# Patient Record
Sex: FEMALE | Race: BLACK OR AFRICAN AMERICAN | Employment: FULL TIME | URBAN - METROPOLITAN AREA
[De-identification: names, ages, dates, MRNs, and addresses within clinical notes are randomized per-mention and may not be internally consistent; named-entity substitution may affect disease eponyms.]

---

## 2019-06-24 ENCOUNTER — HOSPITAL ENCOUNTER (OUTPATIENT)
Dept: PHYSICAL THERAPY | Age: 54
Discharge: HOME | End: 2019-06-24
Payer: MEDICAID

## 2019-06-24 PROCEDURE — 97162 PT EVAL MOD COMPLEX 30 MIN: CPT

## 2019-06-24 PROCEDURE — 97140 MANUAL THERAPY 1/> REGIONS: CPT

## 2019-06-24 NOTE — PROGRESS NOTES
[address] PHYSICAL THERAPY  [address] [place] [address], [facility] [address], [facility] - Phone: [phone]  Fax: [phone] / 75[address] [facility]  Patient Name: Gerald Morillo : 1965   Medical   Diagnosis: Bilateral ankle pain [M25.571, M25.572] Treatment Diagnosis: Bilateral ankle pain [M25.571, M25.572]   Onset Date: ~1.5yrs ago     Referral Source: Mohini Andrews MD [facility]): 2019   Prior Hospitalization: See medical history Provider #: [id number]   Prior Level of Function: Pain free amb   Comorbidities: none   Medications: Verified on Patient Summary List   The Plan of Care and following information is based on the information from the initial evaluation.   ===========================================================================================  Assessment / corrales information:  Gerald Morillo is a 47 y.o.  yo female with Dx of Bilateral ankle pain [M25.571, M25.572]. She reports the onset of L ankle pain after a fall ~1.5yrs ago. Sh currently ratesher pain as 7/10 at worst, 0/10 at best, primarily located at medial aspect of her L ankle. She complains of difficulty and increase pain with walking. Objective Findings:  Gait: significant increase in calcaneal EV and midfoot collapse noted, Ankle AROM: DF: R = 15 deg, L = 14 deg. PF: R = 40 deg, L = 38 deg. Hallux DF: R = 70 deg, L = 50 deg. Manual Muscle Testing:  L ankle IV, L hip abd and B hip ext are Reduced. Lumbar/SI Screening:  L4-5 L ERS, L iliosacral ant innominate noted.    Pt instructed in HEP and will f/u in clinic for PT.  ===========================================================================================  Eval Complexity: History MEDIUM  Complexity : 1-2 comorbidities / personal factors will impact the outcome/ POC ;  Examination  MEDIUM Complexity : 3 Standardized tests and measures addressing body structure, function, activity limitation and / or participation in recreation ; Presentation MEDIUM Complexity : Evolving with changing characteristics ; Decision Making MEDIUM Complexity : FOTO score of 26-74; Overall Complexity MEDIUM  Problem List: pain affecting function, decrease ROM, decrease strength, impaired gait/ balance, decrease ADL/ functional abilitiies, decrease activity tolerance and decrease flexibility/ joint mobility   Treatment Plan may include any combination of the following: Therapeutic exercise, Therapeutic activities, Neuromuscular re-education, Physical agent/modality, Gait/balance training, Manual therapy, Patient education, Self Care training, Functional mobility training and Home safety training  Patient / Family readiness to learn indicated by: asking questions  Persons(s) to be included in education: patient (P)  Barriers to Learning/Limitations: None  Measures taken, if barriers to learning:    Patient Goal (s): Decrease pain    Patient self reported health status: excellent  Rehabilitation Potential: good   Short Term Goals: To be accomplished in  1-2  weeks:  1. Independent with HEP. 2. Decrease max pain 25-50% to assist with amb   Long Term Goals: To be accomplished in  3-4  weeks:  1. Decrease max pain 50-75% to assist with amb  2. Increase FOTO score by 10% to show functional improvment. 3.  Will rate >/= +5 on Global Rating of Change and be prepared to DC to HEP. Frequency / Duration:   Patient to be seen  2-3  times per week for 3-4  weeks:  Patient / Caregiver education and instruction: self care and exercises    Therapist Signature: Zuleyka Mckeon DPT, OCS, SCS, CSCS Date: 3/69/0120   Certification Period: na Time: 12:08 PM   ===========================================================================================  I certify that the above Physical Therapy Services are being furnished while the patient is under my care.   I agree with the treatment plan and certify that this therapy is necessary. Physician Signature:        Date:       Time:     Please sign and return to [facility] or you may fax the signed copy to [phone]. Thank you.

## 2019-06-24 NOTE — PROGRESS NOTES
PHYSICAL THERAPY - DAILY TREATMENT NOTE    Patient Name: Xu Burt        Date: 2019  : 1965   YES Patient  Verified  Visit #:     Insurance: Payor: BLUE CROSS MEDICAID / Plan: [facility] HEALTHKEEPERS PLUS / Product Type: Managed Care Medicaid /      In time: 11:40 Out time: 12:15   Total Treatment Time: 35     Medicare Time Tracking (below)   Total Timed Codes (min):  35 1:1 Treatment Time:  35     TREATMENT AREA =  Bilateral ankle pain [M25.571, M25.572]    SUBJECTIVE  Pain Level (on 0 to 10 scale):    Medication Changes/New allergies or changes in medical history, any new surgeries or procedures? NO    If yes, update Summary List   Subjective Functional Status/Changes:  []  No changes reported     SEE IE          OBJECTIVE    12 min Manual Therapy: DTM post tib, SHot gun tech, L4-5 L ERS harish   Rationale:      decrease pain, increase ROM and increase tissue extensibility to improve patient's ability to amb    8 min Self Care: JENNIFER Barros   Rationale:    increase ROM, increase strength and improve coordination to improve the patients ability to amb      Billed With/As:   [] TE   [] TA   [] Neuro   [x] Self Care Patient Education: [x] Review HEP    [] Progressed/Changed HEP based on:   [] positioning   [] body mechanics   [] transfers   [] heat/ice application    [] other:       min Patient Education:  YES  Reviewed HEP   []  Progressed/Changed HEP based on:         Other Objective/Functional Measures:    SEE IE     Post Treatment Pain Level (on 0 to 10) scale:       ASSESSMENT  Assessment/Changes in Function:     SEE IE     []  See Progress Note/Recertification   Patient will continue to benefit from skilled PT services to modify and progress therapeutic interventions, address functional mobility deficits, address ROM deficits, address strength deficits, analyze and address soft tissue restrictions, analyze and cue movement patterns, analyze and modify body mechanics/ergonomics and assess and modify postural abnormalities to attain remaining goals. Progress toward goals / Updated goals:         PLAN  []  Upgrade activities as tolerated YES Continue plan of care   []  Discharge due to :    []  Other:      Therapist: Shabana Wilburn, PT, OCS, SCS, CSCS    Date: 6/24/2019 Time: 12:06 PM       No future appointments.

## 2019-06-27 ENCOUNTER — HOSPITAL ENCOUNTER (OUTPATIENT)
Dept: PHYSICAL THERAPY | Age: 54
Discharge: HOME | End: 2019-06-27
Payer: MEDICAID

## 2019-06-27 PROCEDURE — 97140 MANUAL THERAPY 1/> REGIONS: CPT

## 2019-06-27 PROCEDURE — 97110 THERAPEUTIC EXERCISES: CPT

## 2019-06-27 NOTE — PROGRESS NOTES
PHYSICAL THERAPY - DAILY TREATMENT NOTE    Patient Name: Devika Talavera        Date: 2019  : 1965   yes Patient  Verified  Visit #:   2   of   12  Insurance: Payor: BLUE CROSS MEDICAID / Plan: [employer]KEEPERS PLUS / Product Type: Managed Care Medicaid /      In time: 4:35 PM Out time: 5:42 PM   Total Treatment Time: 67     Medicare/BCBS Time Tracking (below)   Total Timed Codes (min):  57 1:1 Treatment Time: 30     TREATMENT AREA =  Bilateral ankle pain [M25.571, M25.572]    SUBJECTIVE  Pain Level (on 0 to 10 scale):  0  / 10   Medication Changes/New allergies or changes in medical history, any new surgeries or procedures?    no  If yes, update Summary List   Subjective Functional Status/Changes:  []  No changes reported     Patient currently reports no pain in the ankle, but has more soreness in the L LE and back from HEP       OBJECTIVE    Modalities Rationale:     decrease edema, decrease inflammation and decrease pain to improve patient's ability to complete work duties. min [] Estim, type/location:                                      []  att     []  unatt     []  w/US     []  w/ice    []  w/heat    min []  Mechanical Traction: type/lbs                   []  pro   []  sup   []  int   []  cont    []  before manual    []  after manual    min []  Ultrasound, settings/location:      min []  Iontophoresis w/ dexamethasone, location:                                               []  take home patch       []  in clinic   10 min [x]  Ice     []  Heat    location/position: To B ankle in supine with wedge post-session    min []  Vasopneumatic Device, press/temp:     min []  Other:    [] Skin assessment post-treatment (if applicable):    []  intact    []  redness- no adverse reaction     []redness  adverse reaction:          10/37 min Therapeutic Exercise:  [x]  See flow sheet   Rationale:      increase ROM and increase strength to improve the patients ability to perform walking activities. 20 min Manual Therapy: STM to L post tib, L FHL, B L/S paraspinals, and B glute med/piriformis   Rationale:      decrease pain, increase ROM and increase tissue extensibility to improve patient's ability to perform standing activities. Billed With/As:   [x] TE   [] TA   [] Neuro   [] Self Care Patient Education: [x] Review HEP    [] Progressed/Changed HEP based on:   [] positioning   [] body mechanics   [] transfers   [] heat/ice application    [] other:      Other Objective/Functional Measures:    1:1 TE = 10'    Presented to PT session with high wedges and work clothes. Advised pt to wear appropriate clothing and shoes to perform exercises. Patient responded with understanding     Initiated therex per POC (see flowsheet); req'd cues 100% of therex for proper form/technique    Significant TTP in L FHL and R>L glute med      Post Treatment Pain Level (on 0 to 10) scale:   0  / 10     ASSESSMENT  Assessment/Changes in Function:     Overall demonstrated good tolerance with initiation of PT interventions. Will continue to progress therex to maintain low pain levels and reduce likelihood of onset of symptoms     []  See Progress Note/Recertification   Patient will continue to benefit from skilled PT services to modify and progress therapeutic interventions, address functional mobility deficits, address ROM deficits, address strength deficits, analyze and address soft tissue restrictions, analyze and cue movement patterns, analyze and modify body mechanics/ergonomics and assess and modify postural abnormalities to attain remaining goals.    Progress toward goals / Updated goals:    Progressing towards STG #1 - HEP established and reports compliance     PLAN  [x]  Upgrade activities as tolerated yes Continue plan of care   []  Discharge due to :    []  Other:      Therapist: FIDENCIO Lezama    Date: 6/27/2019 Time: 7:00 PM     Future Appointments   Date Time Provider Fabian Stiles   6/27/2019  4:30 PM Halie Cerda [facility]   7/1/2019  1:30 PM Rico Machado, PT [facility]   7/3/2019  3:30 PM Nohemi Garcia [facility]   7/8/2019  1:30 PM Rico Machado, PT [facility]   [facility]   7/16/2019 11:00 AM Rico Machado, PT [facility]   7/23/2019 11:30 AM Rosa Rangel, PT [facility]

## 2019-07-01 ENCOUNTER — HOSPITAL ENCOUNTER (OUTPATIENT)
Dept: PHYSICAL THERAPY | Age: 54
Discharge: HOME | End: 2019-07-01
Payer: MEDICAID

## 2019-07-01 PROCEDURE — 97110 THERAPEUTIC EXERCISES: CPT

## 2019-07-01 PROCEDURE — 97140 MANUAL THERAPY 1/> REGIONS: CPT

## 2019-07-01 NOTE — PROGRESS NOTES
PHYSICAL THERAPY - DAILY TREATMENT NOTE    Patient Name: Rhae Essex        Date: 2019  : 1965   yes Patient  Verified  Visit #:   3   of   12  Insurance: Payor: REYNALDO Lopez / Plan: [address] [place] [address] / Product Type: Managed Care Medicaid /      In time: 144 Out time: 235   Total Treatment Time: 51     Medicare/BCBS Time Tracking (below)   Total Timed Codes (min):  51 1:1 Treatment Time:  35     TREATMENT AREA =  Bilateral ankle pain [M25.571, M25.572]    SUBJECTIVE  Pain Level (on 0 to 10 scale):  2   10   Medication Changes/New allergies or changes in medical history, any new surgeries or procedures?    no  If yes, update Summary List   Subjective Functional Status/Changes:  []  No changes reported     Patient reports she was really sore after her last appointment and that the inside of her leg on the L only hurts when we touch it. OBJECTIVE    36 min Therapeutic Exercise:  [x]  See flow sheet   Rationale:      increase ROM and increase strength to improve the patients ability to perform walking activities. 15 min Manual Therapy: STM to L posterior tib, L FHL in prone; L ankle DF stretch with knee flexed and extended   Rationale:      decrease pain, increase ROM, increase tissue extensibility and decrease trigger points to improve patient's ability to perform standing activities.        Billed With/As:   [x] TE   [] TA   [] Neuro   [] Self Care Patient Education: [x] Review HEP    [] Progressed/Changed HEP based on:   [] positioning   [] body mechanics   [] transfers   [] heat/ice application    [] other:      Other Objective/Functional Measures:    1:1 TE 21'  Patient presenting with significant tenderness to L posterior tib during MT, limiting effectiveness  Patient requiring encouragement during glute bridge and SL clams today as she reported exercise was difficult to perform     Post Treatment Pain Level (on 0 to 10) scale:     / 10 ASSESSMENT  Assessment/Changes in Function:     Patient noted a slight reduction in symptoms post session. Patient educated on rationale of treatment and reasoning for elevated soreness after her first follow up session. []  See Progress Note/Recertification   Patient will continue to benefit from skilled PT services to modify and progress therapeutic interventions, address functional mobility deficits, address ROM deficits, address strength deficits, analyze and address soft tissue restrictions, analyze and cue movement patterns, analyze and modify body mechanics/ergonomics, assess and modify postural abnormalities and address imbalance/dizziness to attain remaining goals.    Progress toward goals / Updated goals:    Progressing with STG#2     PLAN  [x]  Upgrade activities as tolerated yes Continue plan of care   []  Discharge due to :    []  Other:      Therapist: Miki Thomas PT    Date: 7/1/2019 Time: 3:31 PM     Future Appointments   Date Time Provider Fabian Stiles   7/3/2019  3:30 PM Nicole Garcia [facility]   7/8/2019  1:30 PM Chel Lynne PT [facility]   7/10/2019 10:30 AM Nicole Garcia [facility]   7/16/2019 11:00 AM Chel Lynne PT [facility]   7/23/2019 11:30 AM Chel Lynne PT [facility]

## 2019-07-08 ENCOUNTER — APPOINTMENT (OUTPATIENT)
Dept: PHYSICAL THERAPY | Age: 54
End: 2019-07-08
Payer: MEDICAID

## 2019-07-09 ENCOUNTER — HOSPITAL ENCOUNTER (OUTPATIENT)
Dept: PHYSICAL THERAPY | Age: 54
Discharge: HOME | End: 2019-07-09
Payer: MEDICAID

## 2019-07-09 PROCEDURE — 97110 THERAPEUTIC EXERCISES: CPT

## 2019-07-09 PROCEDURE — 97140 MANUAL THERAPY 1/> REGIONS: CPT

## 2019-07-09 NOTE — PROGRESS NOTES
PHYSICAL THERAPY - DAILY TREATMENT NOTE    Patient Name: Collette Keeler        Date: 2019  : 1965   yes Patient  Verified  Visit #:      12  Insurance: Payor: BLUE CROSS MEDICAID / Plan: [address] [place] [address] / Product Type: Managed Care Medicaid /      In time: 871 Out time: 114   Total Treatment Time: 46     Medicare/BCBS Time Tracking (below)   Total Timed Codes (min):  46 1:1 Treatment Time:  41     TREATMENT AREA =  Bilateral ankle pain [M25.571, M25.572]    SUBJECTIVE  Pain Level (on 0 to 10 scale):  0  / 10   Medication Changes/New allergies or changes in medical history, any new surgeries or procedures?    no  If yes, update Summary List   Subjective Functional Status/Changes:  []  No changes reported     Patient reports she isn't having any pain right now, \"it only hurts when you guys mess with it. \"          OBJECTIVE    34 min Therapeutic Exercise:  [x]  See flow sheet   Rationale:      increase ROM and increase strength to improve the patients ability to perform walking activities. 12 min Manual Therapy: STM to R posterior tib, R FHL; R ankle DF stretch with knee flexed   Rationale:      decrease pain, increase ROM, increase tissue extensibility and decrease trigger points to improve patient's ability to perform standing activities. Billed With/As:   [x] TE   [] TA   [] Neuro   [] Self Care Patient Education: [x] Review HEP    [] Progressed/Changed HEP based on:   [] positioning   [] body mechanics   [] transfers   [] heat/ice application    [] other:      Other Objective/Functional Measures:    1:1 TE 34'  Focused on MT to R LE as patient reported elevated pain after MT to her L LE-plan to resume at [address] pending tolerance  Assessed patient footwear, currently utilized shoes with do not provide wide toe box and proper stability of mid and forefoot.  Instructed patient on this as well as examples of proper sneakers     Post Treatment Pain Level (on 0 to 10) scale:   2  / 10     ASSESSMENT  Assessment/Changes in Function:     Patient noted a slight increase in soreness post session. Patient has been late to several appointments and has been educated on importance of consistent attendance in PT as well as arriving to her appointments on time, patient acknowledged understanding. []  See Progress Note/Recertification   Patient will continue to benefit from skilled PT services to modify and progress therapeutic interventions, address functional mobility deficits, address ROM deficits, address strength deficits, analyze and address soft tissue restrictions, analyze and cue movement patterns, analyze and modify body mechanics/ergonomics and assess and modify postural abnormalities to attain remaining goals.    Progress toward goals / Updated goals:    No significant progress with LTG's this session     PLAN  [x]  Upgrade activities as tolerated yes Continue plan of care   []  Discharge due to :    []  Other:      Therapist: Gianni Worthington PT    Date: 7/9/2019 Time: 5:03 PM     Future Appointments   Date Time Provider Fabian Stiles   7/11/2019 11:00 AM Gissel Garcia [facility]   7/16/2019 11:00 AM Matthew Nazario PT [facility]   7/23/2019 11:30 AM Matthew Nazario PT [facility]

## 2019-07-10 ENCOUNTER — APPOINTMENT (OUTPATIENT)
Dept: PHYSICAL THERAPY | Age: 54
End: 2019-07-10
Payer: MEDICAID

## 2019-07-11 ENCOUNTER — HOSPITAL ENCOUNTER (OUTPATIENT)
Dept: PHYSICAL THERAPY | Age: 54
Discharge: HOME | End: 2019-07-11
Payer: MEDICAID

## 2019-07-11 PROCEDURE — 97110 THERAPEUTIC EXERCISES: CPT

## 2019-07-11 NOTE — PROGRESS NOTES
PHYSICAL THERAPY - DAILY TREATMENT NOTE    Patient Name: Christiano Neves        Date: 2019  : 1965   yes Patient  Verified  Visit #:      12  Insurance: Payor: BLUE CROSS MEDICAID / Plan: [facility] [email or web address]US / Product Type: Managed Care Medicaid /      In time: 11:06 AM Out time: 12:18 PM   Total Treatment Time: 72     Medicare/BCBS Time Tracking (below)   Total Timed Codes (min):  62 1:1 Treatment Time:  24     TREATMENT AREA =  Bilateral ankle pain [M25.571, M25.572]    SUBJECTIVE  Pain Level (on 0 to 10 scale):  1  / 10   Medication Changes/New allergies or changes in medical history, any new surgeries or procedures?    no  If yes, update Summary List   Subjective Functional Status/Changes:  []  No changes reported     Patient continues to report L>R lateral ankle pain. States that \"it really hurts when you guys touch it\" and first thing in the morning. States she \"kind of does home exercises at home, only in the mornings\" and never ices or elevates. .        OBJECTIVE  Modalities Rationale:     decrease edema, decrease inflammation and decrease pain to improve patient's ability to perform prolonged sitting activities.     min [] Estim, type/location:                                      []  att     []  unatt     []  w/US     []  w/ice    []  w/heat    min []  Mechanical Traction: type/lbs                   []  pro   []  sup   []  int   []  cont    []  before manual    []  after manual    min []  Ultrasound, settings/location:      min []  Iontophoresis w/ dexamethasone, location:                                               []  take home patch       []  in clinic   10  min [x]  Ice     []  Heat    location/position: To B ankle in supine with wedge post-session (with extra layer)    min []  Vasopneumatic Device, press/temp:     min []  Other:    [] Skin assessment post-treatment (if applicable):    []  intact    []  redness- no adverse reaction     []redness  adverse reaction: 24/ min Therapeutic Exercise:  [x]  See flow sheet   Rationale:      increase ROM and increase strength to improve the patients ability to perform prolonged walking/standing activities. Billed With/As:   [x] TE   [] TA   [] Neuro   [] Self Care Patient Education: [x] Review HEP    [x] Progressed/Changed HEP based on:   [] positioning   [] body mechanics   [] transfers   [] heat/ice application    [x] other: added multiple foot/ankle strengthening exercises. Other Objective/Functional Measures:    1:1 TE = 24'    Issued and reviewed updated HEP. Educated pt on importance on compliance with HEP (morning and evening) as well as icing and elevating to reduce c/o swelling. Discussed and educated pt on holding on manual therapy due to pt's poor tolerance. Pt responded with understanding. Added towel crunches and standing HR/TR to increase ankle stability. Added incline board stretch and fibularis longus stretch to decrease tightness. Post Treatment Pain Level (on 0 to 10) scale:   0 / 10     ASSESSMENT  Assessment/Changes in Function:     Challenged with PREs due to weakness and req'd moderate cues for all therex. Able to reduce ankle pain post-tx session, however noted minimal increase in pain/soreness in L hip. Discussed rationale for glute/hip strengthening importance for ankle pain. Pt responded with some understanding. []  See Progress Note/Recertification   Patient will continue to benefit from skilled PT services to modify and progress therapeutic interventions, address functional mobility deficits, address ROM deficits, address strength deficits, analyze and address soft tissue restrictions, analyze and cue movement patterns, analyze and modify body mechanics/ergonomics and assess and modify postural abnormalities to attain remaining goals.    Progress toward goals / Updated goals:    Reassess FOTO/GROC goal NV (LTGs #2 and #3)  Steady progress towards pain reduction goal (LTG #1) PLAN  [x]  Upgrade activities as tolerated yes Continue plan of care   []  Discharge due to :    []  Other:      Therapist: FIDENCIO Carty    Date: 7/11/2019 Time: 12:29 PM     Future Appointments   Date Time Provider Fabian Stiles   7/11/2019 11:00 AM Marina Garcia [facility]   7/16/2019 11:00 AM Lilibeth Vallecillo, LAILA [facility]   7/23/2019 11:30 AM Lilibeth Vallecillo, LAILA [facility]

## 2019-07-15 ENCOUNTER — APPOINTMENT (OUTPATIENT)
Dept: PHYSICAL THERAPY | Age: 54
End: 2019-07-15
Payer: MEDICAID

## 2019-07-16 ENCOUNTER — HOSPITAL ENCOUNTER (OUTPATIENT)
Dept: PHYSICAL THERAPY | Age: 54
Discharge: HOME | End: 2019-07-16
Payer: MEDICAID

## 2019-07-16 PROCEDURE — 97110 THERAPEUTIC EXERCISES: CPT

## 2019-07-16 NOTE — PROGRESS NOTES
PHYSICAL THERAPY - DAILY TREATMENT NOTE    Patient Name: Amber Landa        Date: 2019  : 1965   yes Patient  Verified  Visit #:   6   of   12  Insurance: Payor: Сергей Lara / Plan: [facility] HEALTHKEEPERS PLUS / Product Type: Managed Care Medicaid /      In time: 7946 Out time: 7896   Total Treatment Time: 60     Medicare/BCBS Time Tracking (below)   Total Timed Codes (min):  50 1:1 Treatment Time:  10     TREATMENT AREA =  Bilateral ankle pain [M25.571, M25.572]    SUBJECTIVE  Pain Level (on 0 to 10 scale):  1.5-2  / 10   Medication Changes/New allergies or changes in medical history, any new surgeries or procedures?    no  If yes, update Summary List   Subjective Functional Status/Changes:  []  No changes reported     Patient reports her foot doesn't feel as swollen as much anymore. Patient does feel more back pain and stiffness then foot pain. OBJECTIVE  60 min Therapeutic Exercise:  [x]  See flow sheet   Rationale:      increase ROM, increase strength, improve coordination and improve balance to improve the patients ability to perform walking activities. Billed With/As:   [x] TE   [] TA   [] Neuro   [] Self Care Patient Education: [x] Review HEP    [] Progressed/Changed HEP based on:   [] positioning   [] body mechanics   [] transfers   [] heat/ice application    [] other:      Other Objective/Functional Measures:    1:1 TE 10'  FOTO 64/100  Patient requires cuing throughout her session in order to ensure proper form/technique     Post Treatment Pain Level (on 0 to 10) scale:   0  / 10     ASSESSMENT  Assessment/Changes in Function:     Patient noted reduced pain post session. Educated patient she can no longer be consistently late to her appointment or therapy will be discontinued. Patient acknowledged understanding.       []  See Progress Note/Recertification   Patient will continue to benefit from skilled PT services to modify and progress therapeutic interventions, address functional mobility deficits, address ROM deficits, address strength deficits, analyze and address soft tissue restrictions, analyze and cue movement patterns, analyze and modify body mechanics/ergonomics and assess and modify postural abnormalities to attain remaining goals.    Progress toward goals / Updated goals:    Met LTG#2     PLAN  [x]  Upgrade activities as tolerated yes Continue plan of care   []  Discharge due to :    []  Other:      Therapist: Adriana Arreola PT    Date: 7/16/2019 Time: 12:35 PM     Future Appointments   Date Time Provider Fabian Stiles   7/17/2019 12:00 PM Darylene Gobble [facility]   7/23/2019 11:30 AM Paulette Paris, PT [facility]   7/25/2019 11:00 AM Kiersten Arriaga, PTA [facility]

## 2019-07-17 ENCOUNTER — HOSPITAL ENCOUNTER (OUTPATIENT)
Dept: PHYSICAL THERAPY | Age: 54
Discharge: HOME | End: 2019-07-17
Payer: MEDICAID

## 2019-07-17 PROCEDURE — 97110 THERAPEUTIC EXERCISES: CPT

## 2019-07-18 ENCOUNTER — APPOINTMENT (OUTPATIENT)
Dept: PHYSICAL THERAPY | Age: 54
End: 2019-07-18
Payer: MEDICAID

## 2019-07-22 ENCOUNTER — APPOINTMENT (OUTPATIENT)
Dept: PHYSICAL THERAPY | Age: 54
End: 2019-07-22
Payer: MEDICAID

## 2019-07-23 ENCOUNTER — HOSPITAL ENCOUNTER (OUTPATIENT)
Dept: PHYSICAL THERAPY | Age: 54
Discharge: HOME | End: 2019-07-23
Payer: MEDICAID

## 2019-07-23 PROCEDURE — 97110 THERAPEUTIC EXERCISES: CPT

## 2019-07-23 NOTE — PROGRESS NOTES
PHYSICAL THERAPY - DAILY TREATMENT NOTE    Patient Name: Sung Talavera        Date: 2019  : 1965   yes Patient  Verified  Visit #:     Insurance: Payor: Arteaga Maroon / Plan: [facility] HEALTHKEEPERS PLUS / Product Type: Managed Care Medicaid /      In time: 144 Out time: 1140   Total Treatment Time: 25     Medicare/BCBS Time Tracking (below)   Total Timed Codes (min):  25 1:1 Treatment Time:  25     TREATMENT AREA =  Bilateral ankle pain [M25.571, M25.572]    SUBJECTIVE  Pain Level (on 0 to 10 scale):  3  / 10   Medication Changes/New allergies or changes in medical history, any new surgeries or procedures?    no  If yes, update Summary List   Subjective Functional Status/Changes:  []  No changes reported     Pt reporting 8/10 recent max pain. Pt reports 60% improvement in symptoms since beginning PT. Pt arrived 13' late for scheduled appointment. Pt has been talked to about this 2x in the past.  Plan to discharge today. OBJECTIVE  25 min Therapeutic Exercise:  [x]  See flow sheet   Rationale:      increase ROM and increase strength to improve the patients ability to perform pain free ADLs. Billed With/As:   [x] TE   [] TA   [] Neuro   [] Self Care Patient Education: [x] Review HEP    [] Progressed/Changed HEP based on:   [] positioning   [] body mechanics   [] transfers   [] heat/ice application    [] other:      Other Objective/Functional Measures:    GROC = +4     (R) hip abd = 4-/5   (L) hip abd = 4/5     Updated and reviewed HEP exercises.       Post Treatment Pain Level (on 0 to 10) scale:   2  / 10     ASSESSMENT  Assessment/Changes in Function:     See DC      []  See Progress Note/Recertification   Patient will continue to benefit from skilled PT services to    Progress toward goals / Updated goals:    See DC      PLAN  []  Upgrade activities as tolerated  Continue plan of care   [x]  Discharge due to : Program complete   []  Other:      Therapist: Tacos Queen Naveed [place], [place]    Date: 7/23/2019 Time: 11:27 AM     Future Appointments   Date Time Provider Fabian Stiles   7/25/2019  9:30 AM Nabil TAPIA [facility]

## 2019-07-23 NOTE — PROGRESS NOTES
[address] PHYSICAL THERAPY  [address] [place] [address] [address], [address],[place], [address] [facility] - Phone: [phone]  Fax: [phone]  DISCHARGE SUMMARY  Patient Name: Amber Landa : 1965   Treatment/Medical Diagnosis: Bilateral ankle pain [M25.571, M25.572]   Referral Source: Sal Spears MD     Date of Initial Visit: 2019 Attended Visits: 8 Missed Visits: 3     SUMMARY OF TREATMENT  PT has consisted of initial evaluation, therapeutic exercises, HEP, manual therapy, patient education, and modalities. CURRENT STATUS  Pt presented to [facility] PT w/ c/o (B) ankle pain. Pt currently reporting 8/10 max pain and 60% improvement in symptoms since beginning PT. See below for current FOTO/GROC. Hip abd strength: (R) 4-/5, (L) 4/5. Pt has been provided long-term HEP. Pt has missed 3 visits and is consistently 10-15' late for scheduled appointments. Spoke with pt regarding poor/late attendance on 2 occasions with no affect (arrived 13' late on ). Given fair progress toward goals, pt a good candidate for discharge at this time. Goal/Measure of Progress Goal Met? 1. Decrease max pain 50-75% to assist with amb   Status at last Eval: 7/10 max  Current Status: 8/10 no   2. Increase FOTO score by 10% to show functional improvment. Status at last Eval: 54 Current Status: 64 yes   3. Will rate >/= +5 on Global Rating of Change and be prepared to DC to HEP. Status at last Eval: Na  Current Status: +4  progressing     RECOMMENDATIONS  Discontinue therapy. Progressing towards or have reached established goals. If you have any questions/comments please contact us directly at [phone]. Thank you for allowing us to assist in the care of your patient.     LPTA Signature: Vishnu Murray, PTA Date: 2019   Therapist Signature: Joellen Michelle, PT, OCS, SCS, CSCS Time: 12:19 PM     NOTE TO PHYSICIAN:  Your patient's insurance requires this discharge note be signed and returned. PLEASE COMPLETE THE ORDERS BELOW AND RETURN TO:  [facility] PHYSICAL THERAPY    ___ I have read the above report and request that my patient be discharged from therapy.      Physician Signature:        Date:       Time:

## 2019-07-25 ENCOUNTER — APPOINTMENT (OUTPATIENT)
Dept: PHYSICAL THERAPY | Age: 54
End: 2019-07-25
Payer: MEDICAID

## 2019-10-07 ENCOUNTER — HOSPITAL ENCOUNTER (OUTPATIENT)
Dept: PHYSICAL THERAPY | Age: 54
Discharge: HOME | End: 2019-10-07
Payer: MEDICAID

## 2019-10-07 PROCEDURE — 97110 THERAPEUTIC EXERCISES: CPT

## 2019-10-07 PROCEDURE — 97162 PT EVAL MOD COMPLEX 30 MIN: CPT

## 2019-10-07 NOTE — PROGRESS NOTES
PHYSICAL THERAPY - DAILY TREATMENT NOTE    Patient Name: Mk Arteaga        Date: 10/7/2019  : 1965   yes Patient  Verified  Visit #:   1   of   12  Insurance: Payor: BLUE CROSS MEDICAID / Plan: [facility] HEALTHKEEPERS PLUS / Product Type: Managed Care Medicaid /      In time: 1:56 Out time: 2:45   Total Treatment Time: 49     Medicare/BCBS Time Tracking (below)   Total Timed Codes (min):  15 1:1 Treatment Time:  49     TREATMENT AREA =  Pain in left hip [M25.552]  Low back pain [M54.5]    SUBJECTIVE  Pain Level (on 0 to 10 scale):  3  / 10   Medication Changes/New allergies or changes in medical history, any new surgeries or procedures?    no  If yes, update Summary List   Subjective Functional Status/Changes:  []  No changes reported     See initial eval          OBJECTIVE    15 min Therapeutic Exercise:  [x]  See flow sheet   Rationale:      increase strength to improve the patients ability to complete amb/standing activities. Billed With/As:   [x] TE   [] TA   [] Neuro   [] Self Care Patient Education: [x] Review HEP    [] Progressed/Changed HEP based on:   [] positioning   [] body mechanics   [] transfers   [] heat/ice application    [x] other: Pt instructed on and given HEP to be completed daily. Pt educated on correct body mechanics with transfers. Pt educated on signs/sxs of red flags and to seek immediate medical attention/911 if those occur. Reviewed proper attire/footwear for PT sessions. Reviewed POC and goals. Pt reports understanding. Other Objective/Functional Measures:    See initial eval     Post Treatment Pain Level (on 0 to 10) scale:   3   10     ASSESSMENT  Assessment/Changes in Function:     See initial eval     []  See Progress Note/Recertification   Patient will continue to benefit from skilled PT services to see initial eval   Progress toward goals / Updated goals:    Pt denied sharp pains or red flags with initial eval or therapeutic ex.  See initial eval. PLAN  [x]  Upgrade activities as tolerated yes Continue plan of care   []  Discharge due to :    [x]  Other: PT 2x/week for 4-6 weeks.      Therapist: Rupinder Lovett PT    Date: 10/7/2019 Time: 2:45 PM     Future Appointments   Date Time Provider Fabian Stiles   10/10/2019  3:00 PM Radhames Graves [facility]   10/14/2019  1:00 PM Sierra King PTA [facility]

## 2019-10-07 NOTE — PROGRESS NOTES
[address] PHYSICAL THERAPY  [address] [place] [address] [address],[place], [address] [facility] - Phone: [phone]  Fax: [phone] OF [facility] / [address] [employer]  Patient Name: Rolf Archer : 1965   Medical   Diagnosis: L hip pain, Lumbago Treatment Diagnosis: Pain in left hip [M25.552]  Low back pain [M54.5]   Onset Date: 2019     Referral Source: Jennifer Crowe* Start of Care [facility]): 10/7/2019   Prior Hospitalization: See medical history Provider #: [id number]   Prior Level of Function: Complete prolonged standing (>30 minutes), washing dishes and walking for exercise without LBP or L hip pain/numbness   Comorbidities: Weight gain of 15 lbs since 2019, Recent fall (19) with R shoulder, wrist and rib pain; Current B knee pain   Medications: Verified on Patient Summary List   The Plan of Care and following information is based on the information from the initial evaluation.   ===========================================================================================  Assessment / key information:  Pt is a 50 y/o female reporting L hip/glute pain and LBP rated 2-7/10 that started in 2019 while completing PT for B foot pain. Pt reports noticing pain/numbness/tingling in L hip with clam exercise and LBP with massage. Pt reports pain/sxs continued so she saw MD, who referred her to PT. Pt has PMHx of chronic LBP after falling 2 years ago which was Rx by chiropractor (last manipulation in 2019). Pt describes pain as dull. Pt reports intermittent numbness/tingling in l/s, L hip and glute which radiates distally (pt unable to state if pain/sxs go to knee or foot). Pt reports intermittent L groin pain. L/s and L hip/glute pain/sxs increase with walking around [facility], stair negotiation, clam exercise, washing dishes and prolonged standing (> 30 minutes); decreases with rest, stretching and massage.  Pt denies instability in LE, but reports she is more cautious with amb since falling over rock while walking in [place] on 9/19/19. Pt denies recent imaging of l/s or hips. Pt denies bowel/bladder issues or red flags. Pt demonstrates antalgic gait with decreased naima, increased pronation and (+) L>R Trendelenberg, decreased l/s AROM (flex to toes, ext decreased 75%, B lat flex to knees, B Rot decreased 50%, discomfort with CORAZON), decreased B hip AROM (R: 22 IR, 27 ER, 90 flex; L: 31 IR, 24 ER, 90 flex), (+) R 90/90 SLR, (-) B Scour, Slump and SLR, no signs/sxs of DVT, increased sensation to light touch L med calf, poor body mechanics with transfers, decreased core strength/stability, decreased B hip strength (4/5 R hip flex, 4-/5 L hip flex, 3+/5 B hip ABD, 3/5 B hip ext), 4+/5 B quad, 4/5 B HS, decreased B RF/quad flexibility, TTP B glute max/med, piriformis, proximal HS, and l/s paraspinals, even innominates and decreased functional mobility. FOTO Score: 43/100  ===========================================================================================  Eval Complexity: History MEDIUM  Complexity : 1-2 comorbidities / personal factors will impact the outcome/ POC ;  Examination  HIGH Complexity : 4+ Standardized tests and measures addressing body structure, function, activity limitation and / or participation in recreation ; Presentation MEDIUM Complexity : Evolving with changing characteristics ;   Decision Making MEDIUM Complexity : FOTO score of 26-74; Overall Complexity MEDIUM  Problem List: pain affecting function, decrease ROM, decrease strength, impaired gait/ balance, decrease ADL/ functional abilitiies, decrease activity tolerance, decrease flexibility/ joint mobility and decrease transfer abilities   Treatment Plan may include any combination of the following: Therapeutic exercise, Therapeutic activities, Neuromuscular re-education, Physical agent/modality, Gait/balance training, Manual therapy, Aquatic therapy, Patient education, Self Care training, Functional mobility training, Home safety training and Stair training  Patient / Family readiness to learn indicated by: asking questions and trying to perform skills  Persons(s) to be included in education: patient (P)  Barriers to Learning/Limitations: None  Measures taken, if barriers to learning:    Patient Goal (s): \"Correct whatever causes the pain and numbness\"   Patient self reported health status: excellent  Rehabilitation Potential: good   Short Term Goals: To be accomplished in  2  weeks:  1. Pt to demonstrate independence with HEP to improve glute/core/hip strength/stability for amb/standing activities. 2. Pt to demonstrate correct body mechanics with supine to sit and sit to stand transfers to protect l/s and improve safety and efficiency of transfers at home.  Long Term Goals: To be accomplished in  4-6  weeks:  1. Pt to demonstrate 2 grade improvement or > B hip ext/ABD strength to improve LE stability for prolonged standing/amb activities. 2. Pt to report 8 point or > improvement on FOTO scores to improve functional mobility for prolonged amb/standing activities and stair negotiation. 3. Pt to report 50% or > decrease in max pain levels to improve functional mobility for prolonged amb/standing activities and stair negotiation. Frequency / Duration:   Patient to be seen  2  times per week for 4-6  weeks:  Patient / Caregiver education and instruction: self care, activity modification and exercises  Therapist Signature: Elias Zamudio PT Date: 69/1/5428   Certification Period: N/a Time: 2:45 PM   ===========================================================================================  I certify that the above Physical Therapy Services are being furnished while the patient is under my care. I agree with the treatment plan and certify that this therapy is necessary.     Physician Signature:        Date:       Time:     Please sign and return to [facility] [place]. or you may fax the signed copy to [phone]. Thank you.

## 2019-10-10 ENCOUNTER — HOSPITAL ENCOUNTER (OUTPATIENT)
Dept: PHYSICAL THERAPY | Age: 54
Discharge: HOME | End: 2019-10-10
Payer: MEDICAID

## 2019-10-10 PROCEDURE — 97110 THERAPEUTIC EXERCISES: CPT

## 2019-10-10 NOTE — PROGRESS NOTES
PHYSICAL THERAPY - DAILY TREATMENT NOTE    Patient Name: Ayush Alejandro        Date: 10/10/2019  : 1965   yes Patient  Verified  Visit #:     Insurance: Payor: Erika Arizmendi / Plan: [address] [place] [address] / Product Type: Managed Care Medicaid /      In time: 315 Out time: 405   Total Treatment Time: 50     Medicare/BCBS Time Tracking (below)   Total Timed Codes (min):  40 1:1 Treatment Time:  40     TREATMENT AREA =  Pain in left hip [M25.552]  Low back pain [M54.5]    SUBJECTIVE  Pain Level (on 0 to 10 scale):  3  / 10   Medication Changes/New allergies or changes in medical history, any new surgeries or procedures?    no  If yes, update Summary List   Subjective Functional Status/Changes:  []  No changes reported     Pt states it's not bothering her much today because she hasn't done a lot. Pt reports pain across the low back when it is hurting. OBJECTIVE  Modalities Rationale:     decrease inflammation and decrease pain to improve patient's ability to perform pain free ADLs. min [] Estim, type/location:                                      []  att     []  unatt     []  w/US     []  w/ice    []  w/heat    min []  Mechanical Traction: type/lbs                   []  pro   []  sup   []  int   []  cont    []  before manual    []  after manual    min []  Ultrasound, settings/location:      min []  Iontophoresis w/ dexamethasone, location:                                               []  take home patch       []  in clinic   10 min [x]  Ice     []  Heat    location/position: Supine, lumbar.      min []  Vasopneumatic Device, press/temp:     min []  Other:    [x] Skin assessment post-treatment (if applicable):    [x]  intact    []  redness- no adverse reaction     []redness  adverse reaction:        40 min Therapeutic Exercise:  [x]  See flow sheet   Rationale:      increase ROM, increase strength and improve coordination to improve the patients ability to perform pain free ADLs. Billed With/As:   [x] TE   [] TA   [] Neuro   [] Self Care Patient Education: [x] Review HEP    [] Progressed/Changed HEP based on:   [] positioning   [] body mechanics   [] transfers   [] heat/ice application    [] other:      Other Objective/Functional Measures: Added multiple exercises to improve core strength and stability for ADLs. Post Treatment Pain Level (on 0 to 10) scale:   1  / 10     ASSESSMENT  Assessment/Changes in Function:     No exacerbation of symptoms with today's session. []  See Progress Note/Recertification   Patient will continue to benefit from skilled PT services to modify and progress therapeutic interventions, address functional mobility deficits, address ROM deficits, address strength deficits, analyze and address soft tissue restrictions, analyze and cue movement patterns, analyze and modify body mechanics/ergonomics and assess and modify postural abnormalities to attain remaining goals. Progress toward goals / Updated goals:    Initiated therex.       PLAN  [x]  Upgrade activities as tolerated yes Continue plan of care   []  Discharge due to :    []  Other:      Therapist: Gabe Sullivan PTA    Date: 10/10/2019 Time: 3:21 PM     Future Appointments   Date Time Provider Fabian Stiles   10/14/2019  1:00 PM Bettie TAPIA [facility]

## 2019-10-17 ENCOUNTER — HOSPITAL ENCOUNTER (OUTPATIENT)
Dept: PHYSICAL THERAPY | Age: 54
Discharge: HOME | End: 2019-10-17
Payer: MEDICAID

## 2019-10-17 PROCEDURE — 97110 THERAPEUTIC EXERCISES: CPT

## 2019-10-17 NOTE — PROGRESS NOTES
PHYSICAL THERAPY - DAILY TREATMENT NOTE    Patient Name: Gavi Landaverde        Date: 10/17/2019  : 1965   yes Patient  Verified  Visit #:   3   of   12  Insurance: Payor: BLUE CROSS MEDICAID / Plan: [facility] HEALTHKEEPERS PLUS / Product Type: Managed Care Medicaid /      In time: 12:06 PM Out time: 12:52 PM   Total Treatment Time: 46     Medicare/BCBS Time Tracking (below)   Total Timed Codes (min):  36 1:1 Treatment Time:  36     TREATMENT AREA =  Pain in left hip [M25.552]  Low back pain [M54.5]    SUBJECTIVE  Pain Level (on 0 to 10 scale): 7 / 10   Medication Changes/New allergies or changes in medical history, any new surgeries or procedures?    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 Reports \"kind of doing\" her HEP. OBJECTIVE  Modalities Rationale:     decrease inflammation and decrease pain to improve patient's ability to perform pain free ADLs.     min [] Estim, type/location:                                      []  att     []  unatt     []  w/US     []  w/ice    []  w/heat    min []  Mechanical Traction: type/lbs                   []  pro   []  sup   []  int   []  cont    []  before manual    []  after manual    min []  Ultrasound, settings/location:      min []  Iontophoresis w/ dexamethasone, location:                                               []  take home patch       []  in clinic   10 min [x]  Ice     []  Heat    location/position: To L/S in supine with wedge post-session    min []  Vasopneumatic Device, press/temp:     min []  Other:    [x] Skin assessment post-treatment (if applicable):    [x]  intact    []  redness- no adverse reaction     []redness  adverse reaction:        36 min Therapeutic Exercise:  [x]  See flow sheet   Rationale:      increase ROM, increase strength and improve coordination to improve the patients ability to perform pain free ADLs. Billed With/As:   [x] TE   [] TA   [] Neuro   [] Self Care Patient Education: [x] Review HEP    [] Progressed/Changed HEP based on:   [] positioning   [] body mechanics   [] transfers   [] heat/ice application    [] other:      Other Objective/Functional Measures:    1:1 TE = 36'    Continued therex per flowsheet. Req'd cues 100% of therex for proper form and gentle ROM/muscle activation. Pt easily distracted by environmental factors. Discussed at length on arriving on time or early to future PT sessions. Pt responded she arrived on time, but had to use the restroom. Patient also stated she has 15 minutes after her appt time to arrive. Therapist advised against consistently arriving late to her appts to allow max benefits from PT interventions. Post Treatment Pain Level (on 0 to 10) scale:   6 / 10     ASSESSMENT  Assessment/Changes in Function:     Patient noted minimal reduction in pain level post tx session. Able to perform therex without signs of distress or red flags. Educated pt on HEP compliance to self manage symptoms. []  See Progress Note/Recertification   Patient will continue to benefit from skilled PT services to modify and progress therapeutic interventions, address functional mobility deficits, address ROM deficits, address strength deficits, analyze and address soft tissue restrictions, analyze and cue movement patterns, analyze and modify body mechanics/ergonomics and assess and modify postural abnormalities to attain remaining goals. Progress toward goals / Updated goals: · Short Term Goals: To be accomplished in  2  weeks:  1. Pt to demonstrate independence with HEP to improve glute/core/hip strength/stability for amb/standing activities. 2. Pt to demonstrate correct body mechanics with supine to sit and sit to stand transfers to protect l/s and improve safety and efficiency of transfers at home. · Long Term Goals: To be accomplished in  4-6  weeks:  1.  Pt to demonstrate 2 grade improvement or > B hip ext/ABD strength to improve LE stability for prolonged standing/amb activities. 2. Pt to report 8 point or > improvement on FOTO scores to improve functional mobility for prolonged amb/standing activities and stair negotiation. 3. Pt to report 50% or > decrease in max pain levels to improve functional mobility for prolonged amb/standing activities and stair negotiation.     No progress towards PT goals today       PLAN  [x]  Upgrade activities as tolerated yes Continue plan of care   []  Discharge due to :    []  Other:      Therapist: FIDENCIO Martinez    Date: 10/17/2019 Time: 12:59 PM     Future Appointments   Date Time Provider Fabian Stiles   10/17/2019 12:00 PM Joce Dillon

## 2019-10-21 ENCOUNTER — APPOINTMENT (OUTPATIENT)
Dept: PHYSICAL THERAPY | Age: 54
End: 2019-10-21
Payer: MEDICAID

## 2019-10-24 ENCOUNTER — HOSPITAL ENCOUNTER (OUTPATIENT)
Dept: PHYSICAL THERAPY | Age: 54
End: 2019-10-24
Payer: MEDICAID

## 2019-10-25 ENCOUNTER — APPOINTMENT (OUTPATIENT)
Dept: PHYSICAL THERAPY | Age: 54
End: 2019-10-25
Payer: MEDICAID

## 2019-11-01 ENCOUNTER — HOSPITAL ENCOUNTER (OUTPATIENT)
Dept: PHYSICAL THERAPY | Age: 54
End: 2019-11-01

## 2019-11-07 ENCOUNTER — APPOINTMENT (OUTPATIENT)
Dept: PHYSICAL THERAPY | Age: 54
End: 2019-11-07

## 2020-03-24 NOTE — PROGRESS NOTES
Barbara Capps 32  [facility] THERAPY  [address] [place] [address], [address] [place] [address] - Phone: [phone]  Fax: [phone]    DISCHARGE NOTE  Patient Name: Paulette Arreola : 1965   Treatment/Medical Diagnosis: Pain in left hip [M25.552]  Low back pain [M54.5]   Referral Source: Giovanni Ingram PA-C     Date of Initial Visit: 10/7/2019 Attended Visits: 3 Missed Visits: 4       SUMMARY OF TREATMENT  Pt attended only initial evaluation and     2     follow-ups and then did not return. Therefore a formal reassessment of goals was not performed. RECOMMENDATIONS  Discontinue physical therapy due to patient not returning. If you have any questions/comments please contact us directly at [phone]. Thank you for allowing us to assist in the care of your patient. Therapist Signature: Frankie Carrel, PTA Tyler Cowboy, DPT, ATC Date: 10/17/2019     Time: 1:33 PM       NOTE TO PHYSICIAN:  Your patient's insurance requires this discharge note be signed and returned. PLEASE COMPLETE THE ORDERS BELOW AND RETURN TO:  [facility] PHYSICAL THERAPY    ___ I have read the above report and request that my patient be discharged from therapy.      Physician Signature:        Date:       Time: